# Patient Record
(demographics unavailable — no encounter records)

---

## 2017-01-17 NOTE — EDDOCDS
Physician Documentation                                                                           

[facility]                                                                         

Name: Jud Ren                                                                                

Age: 34 yrs                                                                                       

Sex: Female                                                                                       

: 1982                                                                                   

MRN: [id number]                                                                                     

Arrival Date: 2017                                                                          

Time: 02:35                                                                                       

Account#: [id number]                                                                              

Bed 9                                                                                             

Private MD:                                                                                       

Disposition:                                                                                      

17 04:15 Discharged to Home/Self Care. Impression: Acute serous otitis media, right ear.    

- Condition is Stable.                                                                            

- Discharge Instructions: Otitis Media, Adult, Otitis Media, Adult, Easy-to-Read.                 

- Prescriptions for Amoxicillin 500 mg Oral Capsule - take 1 capsule by ORAL route                

every 8 hours for 10 days; 30 tablet.                                                           

- Medication Reconciliation, Local Pharmacy Hours form.                                           

- [facility]; When: Call to arrange an appointment;            

Reason: To establish care.                                                                      

- Problem is an ongoing problem.                                                                  

- Symptoms have improved.                                                                         

                                                                                                  

                                                                                                  

                                                                                                  

Historical:                                                                                       

- Allergies: No known drug Allergies;                                                             

- Home Meds:                                                                                      

1. birth control                                                                                

- PMHx: none;                                                                                     

- PSHx: none;                                                                                     

- Social history: Smoking status: Patient states was never smoker of tobacco. No                  

barriers to communication noted, The patient speaks fluent English, Speaks                      

appropriately for age.                                                                          

- Family history: Not pertinent.                                                                  

- : The pt / caregiver states he / she is not on anticoagulants. Home medication list             

is obtained from the patient.                                                                   

- Exposure Risk Screening:: None identified.                                                      

                                                                                                  

                                                                                                  

OB/GYN:                                                                                           

                                                                                             

02:49 LMP 12/15/2016                                                                          cz  

                                                                                                  

Vital Signs:                                                                                      

02:49  / 83; Pulse 69; Resp 16; Temp 98.5(O); Pulse Ox 98% on R/A; Weight 69.4 kg / 153 cz  

      lbs; Height 5 ft. 9 in. (175.26 cm);                                                        

04:30  / 85; Pulse 68; Resp 18; Temp 99.5(TE); Pulse Ox 99% on R/A; Pain 8/10;          kenneth 

02:49 Body Mass Index 22.59 (69.40 kg, 175.26 cm)                                             cz  

                                                                                                  

MDM:                                                                                              

04:14 Amoxicillin 500 mg PO once ordered.                                                     mm11

                                                                                                  

Administered Medications:                                                                         

04:31 Drug: Amoxicillin 500 mg [amoxicillin 500 mg capsule (1 caps)] Route: PO;               ko2 

                                                                                                  

                                                                                                  

Signatures:                                                                                       

Richard Cline RN RN cz Maynard, Matthew, DO                    DO   mm11                                                 

Dedra Segundo RN RN   ko2                                                  

                                                                                                  

**************************************************************************************************

MTDD

## 2017-01-17 NOTE — EDDOCDS
Nurse's Notes                                                                                     

[facility]                                                                         

Name: Jud Ren                                                                                

Age: 34 yrs                                                                                       

Sex: Female                                                                                       

: 1982                                                                                   

MRN: [id number]                                                                                     

Arrival Date: 2017                                                                          

Time: 02:35                                                                                       

Account#: [id number]                                                                              

Bed 9                                                                                             

Private MD:                                                                                       

Diagnosis: Acute serous otitis media, right ear                                                   

                                                                                                  

Presentation:                                                                                     

                                                                                             

02:47 Presenting complaint: Patient states: URI symptoms since Friday developed right ear     cz  

      pain earlier tonight. Adult Sepsis Screening: The patient does not have new or              

      worsening altered mentation. Patient's respiratory rate is less than 22. Systolic blood     

      pressure is greater than 100. Patient has a qSOFA score of 0- Negative Sepsis Screen.       

      Suicide/Homicide risk assessment- the patient denies having any suicidal and/or             

      homicidal ideations and does not present with any other emotional, behavioral or mental     

      health complaints.  Status: The patient is a  dependent. Transition of      

      care: patient was not received from another setting of care.                                

02:47 Acuity: MAXIMO Level 5                                                                     cz  

02:47 Method Of Arrival: Walkin/Carried/Asstd                                                 cz  

                                                                                                  

Triage Assessment:                                                                                

02:49 General: Appears in no apparent distress. Pain: Location: right ear Pain currently is 8 cz  

      out of 10 on a pain scale. Pt Declines HIV testing.                                         

                                                                                                  

OB/GYN:                                                                                           

02:49 LMP 12/15/2016                                                                          cz  

                                                                                                  

Historical:                                                                                       

- Allergies: No known drug Allergies;                                                             

- Home Meds:                                                                                      

1. birth control                                                                                

- PMHx: none;                                                                                     

- PSHx: none;                                                                                     

- Social history: Smoking status: Patient states was never smoker of tobacco. No                  

barriers to communication noted, The patient speaks fluent English, Speaks                      

appropriately for age.                                                                          

- Family history: Not pertinent.                                                                  

- : The pt / caregiver states he / she is not on anticoagulants. Home medication list             

is obtained from the patient.                                                                   

- Exposure Risk Screening:: None identified.                                                      

                                                                                                  

                                                                                                  

Screenin:35 Screening information is obtained from the patient. Fall risk: No risks identified.     ko2 

      Assistance ADL's: requires no assistance with activities of daily living. Abuse/DV          

      Screen: The patient / caregiver reports he/she is: not in a situation that causes fear,     

      pain or injury. Nutritional screening: No deficits noted. Advance Directives:               

      Currently, there is no health care proxy. There is no active DNR order. home support is     

      adequate.                                                                                   

                                                                                                  

Assessment:                                                                                       

03:10 General: Appears uncomfortable, Behavior is appropriate for age, cooperative. Pain:     ko2 

      Location: right ear. Neurological: Level of Consciousness is awake, alert. EENT:            

      Reports pain in right ear. Respiratory: Airway is patent Respiratory effort is even,        

      unlabored. Derm: Skin is normal.                                                            

04:36 General: Appears uncomfortable, Behavior is appropriate for age, cooperative. Pain:     ko2 

      Location: right ear Pain currently is 7 out of 10 on a pain scale. Neurological: No         

      deficits noted. Respiratory: Airway is patent Respiratory effort is even, unlabored,        

      Reports cough that is. Derm: Skin is normal.                                                

                                                                                                  

Vital Signs:                                                                                      

02:49  / 83; Pulse 69; Resp 16; Temp 98.5(O); Pulse Ox 98% on R/A; Weight 69.4 kg;      cz  

      Height 5 ft. 9 in. (175.26 cm);                                                             

04:30  / 85; Pulse 68; Resp 18; Temp 99.5(TE); Pulse Ox 99% on R/A; Pain 8/10;          kenneth 

02:49 Body Mass Index 22.59 (69.40 kg, 175.26 cm)                                               

                                                                                                  

Vitals:                                                                                           

02:49 Log In Time: 2017 at 02:35.                                                 cz  

                                                                                                  

ED Course:                                                                                        

02:36 Patient visited by David Lang Reg.                                                 pm4 

02:36 Patient moved to Waiting                                                                pm4 

02:47 Patient moved to Triage 1                                                               cz  

02:48 Triage Initiated                                                                        cz  

02:52 Patient moved to Pre RCE                                                                cz  

03:06 Dedra Segundo,RN is Primary Nurse.                                                         cz  

03:06 Patient moved to 9                                                                      cz  

03:22 Patient visited by Bancroft, Kristopher, PCA.                                           kb5 

04:02 Liban Madrigal DO is Attending Physician.                                            mm11

04:02 Patient visited by Liban Madrigal DO.                                                mm11

04:14 Patient visited by Liban Madrigal DO.                                                mm11

04:15 [facility] is Referral Physician.                               mm11

04:36 The patient / caregiver is instructed regarding the plan of care and ED course.         ko2 

04:36 No IV's were initiated during this patient's visit. No procedures done that require     ko2 

      assistance.                                                                                 

                                                                                                  

Administered Medications:                                                                         

04:31 Drug: Amoxicillin 500 mg [amoxicillin 500 mg capsule (1 caps)] Route: PO;               ko2 

                                                                                                  

                                                                                                  

Order Results:                                                                                    

There are currently no results for this order.                                                    

Outcome:                                                                                          

04:15 Discharge ordered by Provider.                                                          mm11

04:37 Discharge Assessment: Patient awake, alert and oriented x 3. No cognitive and/or        ko2 

      functional deficits noted. Patient verbalized understanding of disposition                  

      instructions. patient administered narcotics - no. The following High Risk Discharge        

      criteria are identified: None. Discharged to home ambulatory, with parent. Condition:       

      stable. Discharge instructions given to patient, Instructed on discharge instructions,      

      follow up and referral plans. medication usage, Demonstrated understanding of               

      instructions, medications, Pt was receptive of discharge instructions/ teaching.            

      Prescriptions given X 1. No special radiology studies were completed. Property sent         

      home with patient.                                                                          

04:37 Patient left the ED.                                                                    ko2 

                                                                                                  

Signatures:                                                                                       

Richard Cline, RN                      RN   cz                                                   

Bancroft, Kristopher, PCA               PCA  kb5                                                  

Liban Madrigal, DO MICHAUD   mm11                                                 

Nicolasa Wheeler, PCA                     PCA  kenneth                                                  

Dedra Segundo RN RN   ko2                                                  

David Lang, Reg                     Reg  pm4                                                  

                                                                                                  

**************************************************************************************************

MTDD

## 2017-01-19 NOTE — EDDOCDS
Physician Documentation                                                                           

[facility]                                                                         

Name: Jud Ren                                                                                

Age: 34 yrs                                                                                       

Sex: Female                                                                                       

: 1982                                                                                   

MRN: [id number]                                                                                     

Arrival Date: 2017                                                                          

Time: 02:35                                                                                       

Account#: [id number]                                                                              

Bed 9                                                                                             

Private MD:                                                                                       

Disposition:                                                                                      

17 04:15 Discharged to Home/Self Care. Impression: Acute serous otitis media, right ear.    

- Condition is Stable.                                                                            

- Discharge Instructions: Otitis Media, Adult, Otitis Media, Adult, Easy-to-Read.                 

- Prescriptions for Amoxicillin 500 mg Oral Capsule - take 1 capsule by ORAL route                

every 8 hours for 10 days; 30 tablet.                                                           

- Medication Reconciliation, Local Pharmacy Hours form.                                           

- [facility]; When: Call to arrange an appointment;            

Reason: To establish care.                                                                      

- Problem is an ongoing problem.                                                                  

- Symptoms have improved.                                                                         

                                                                                                  

                                                                                                  

                                                                                                  

Historical:                                                                                       

- Allergies: No known drug Allergies;                                                             

- Home Meds:                                                                                      

1. birth control                                                                                

- PMHx: none;                                                                                     

- PSHx: none;                                                                                     

- Social history: Smoking status: Patient states was never smoker of tobacco. No                  

barriers to communication noted, The patient speaks fluent English, Speaks                      

appropriately for age.                                                                          

- Family history: Not pertinent.                                                                  

- : The pt / caregiver states he / she is not on anticoagulants. Home medication list             

is obtained from the patient.                                                                   

- Exposure Risk Screening:: None identified.                                                      

                                                                                                  

                                                                                                  

OB/GYN:                                                                                           

                                                                                             

02:49 LMP 12/15/2016                                                                          cz  

                                                                                                  

Vital Signs:                                                                                      

02:49  / 83; Pulse 69; Resp 16; Temp 98.5(O); Pulse Ox 98% on R/A; Weight 69.4 kg / 153 cz  

      lbs; Height 5 ft. 9 in. (175.26 cm);                                                        

04:30  / 85; Pulse 68; Resp 18; Temp 99.5(TE); Pulse Ox 99% on R/A; Pain 8/10;          kenneth 

02:49 Body Mass Index 22.59 (69.40 kg, 175.26 cm)                                             cz  

                                                                                                  

MDM:                                                                                              

04:14 Amoxicillin 500 mg PO once ordered.                                                     mm11

04:44 [facility] Payment Agreement was scanned into [employer] and attached to record.               hs2 

04:50 Financial registration complete.                                                        hs2 

12:10 T-Sheet-- Draft Copy was scanned into [employer] and attached to record.                   gb  

                                                                                                  

Administered Medications:                                                                         

04:31 Drug: Amoxicillin 500 mg [amoxicillin 500 mg capsule (1 caps)] Route: PO;               ko2 

                                                                                                  

                                                                                                  

Signatures:                                                                                       

Richard Cline, DARLENE                      RN   cz                                                   

Darling Pritchard, Reg                  Reg  gb                                                   

Liban Madrigal DO DO   mm11                                                 

Dedra Segundo RN RN   ko2                                                  

Jailene Pearl, Reg                   Reg  hs2                                                  

                                                                                                  

The chart was reviewed and I authenticate all verbal orders and agree with the evaluation and 
treatment provided.Attachments:

04:44 [facility] Payment Agreement                                                                hs2 

12:10 T-Sheet-- Draft Copy                                                                    gb  

                                                                                                  

**************************************************************************************************



*** Chart Complete ***
MTDD

## 2017-01-19 NOTE — EDDOCDS
Physician Documentation                                                                           

[facility]                                                                         

Name: Jud Ren                                                                                

Age: 34 yrs                                                                                       

Sex: Female                                                                                       

: 1982                                                                                   

MRN: [id number]                                                                                     

Arrival Date: 2017                                                                          

Time: 02:35                                                                                       

Account#: [id number]                                                                              

Bed 9                                                                                             

Private MD:                                                                                       

Disposition:                                                                                      

17 04:15 Discharged to Home/Self Care. Impression: Acute serous otitis media, right ear.    

- Condition is Stable.                                                                            

- Discharge Instructions: Otitis Media, Adult, Otitis Media, Adult, Easy-to-Read.                 

- Prescriptions for Amoxicillin 500 mg Oral Capsule - take 1 capsule by ORAL route                

every 8 hours for 10 days; 30 tablet.                                                           

- Medication Reconciliation, Local Pharmacy Hours form.                                           

- [facility]; When: Call to arrange an appointment;            

Reason: To establish care.                                                                      

- Problem is an ongoing problem.                                                                  

- Symptoms have improved.                                                                         

                                                                                                  

                                                                                                  

                                                                                                  

Historical:                                                                                       

- Allergies: No known drug Allergies;                                                             

- Home Meds:                                                                                      

1. birth control                                                                                

- PMHx: none;                                                                                     

- PSHx: none;                                                                                     

- Social history: Smoking status: Patient states was never smoker of tobacco. No                  

barriers to communication noted, The patient speaks fluent English, Speaks                      

appropriately for age.                                                                          

- Family history: Not pertinent.                                                                  

- : The pt / caregiver states he / she is not on anticoagulants. Home medication list             

is obtained from the patient.                                                                   

- Exposure Risk Screening:: None identified.                                                      

                                                                                                  

                                                                                                  

OB/GYN:                                                                                           

                                                                                             

02:49 LMP 12/15/2016                                                                          cz  

                                                                                                  

Vital Signs:                                                                                      

02:49  / 83; Pulse 69; Resp 16; Temp 98.5(O); Pulse Ox 98% on R/A; Weight 69.4 kg / 153 cz  

      lbs; Height 5 ft. 9 in. (175.26 cm);                                                        

04:30  / 85; Pulse 68; Resp 18; Temp 99.5(TE); Pulse Ox 99% on R/A; Pain 8/10;          kenneth 

02:49 Body Mass Index 22.59 (69.40 kg, 175.26 cm)                                             cz  

                                                                                                  

MDM:                                                                                              

04:14 Amoxicillin 500 mg PO once ordered.                                                     mm11

04:44 [facility] Payment Agreement was scanned into [employer] and attached to record.               hs2 

04:50 Financial registration complete.                                                        hs2 

12:10 T-Sheet-- Draft Copy was scanned into [employer] and attached to record.                   gb  

                                                                                                  

Administered Medications:                                                                         

04:31 Drug: Amoxicillin 500 mg [amoxicillin 500 mg capsule (1 caps)] Route: PO;               ko2 

                                                                                                  

                                                                                                  

Signatures:                                                                                       

Richard Cline, DARLENE                      RN   cz                                                   

Darling Pritchard, Reg                  Reg  gb                                                   

Liban Madrigal DO DO   mm11                                                 

Dedra Segundo RN RN   ko2                                                  

Jailene Pearl, Reg                   Reg  hs2                                                  

                                                                                                  

The chart was reviewed and I authenticate all verbal orders and agree with the evaluation and 
treatment provided.Attachments:

04:44 [facility] Payment Agreement                                                                hs2 

12:10 T-Sheet-- Draft Copy                                                                    gb  

                                                                                                  

**************************************************************************************************



*** Chart Complete ***
MTDD

## 2017-01-19 NOTE — EDDOCDS
Nurse's Notes                                                                                     

[facility]                                                                         

Name: Jud Ren                                                                                

Age: 34 yrs                                                                                       

Sex: Female                                                                                       

: 1982                                                                                   

MRN: [id number]                                                                                     

Arrival Date: 2017                                                                          

Time: 02:35                                                                                       

Account#: [id number]                                                                              

Bed 9                                                                                             

Private MD:                                                                                       

Diagnosis: Acute serous otitis media, right ear                                                   

                                                                                                  

Presentation:                                                                                     

                                                                                             

02:47 Presenting complaint: Patient states: URI symptoms since Friday developed right ear     cz  

      pain earlier tonight. Adult Sepsis Screening: The patient does not have new or              

      worsening altered mentation. Patient's respiratory rate is less than 22. Systolic blood     

      pressure is greater than 100. Patient has a qSOFA score of 0- Negative Sepsis Screen.       

      Suicide/Homicide risk assessment- the patient denies having any suicidal and/or             

      homicidal ideations and does not present with any other emotional, behavioral or mental     

      health complaints.  Status: The patient is a  dependent. Transition of      

      care: patient was not received from another setting of care.                                

02:47 Acuity: MAXIMO Level 5                                                                     cz  

02:47 Method Of Arrival: Walkin/Carried/Asstd                                                 cz  

                                                                                                  

Triage Assessment:                                                                                

02:49 General: Appears in no apparent distress. Pain: Location: right ear Pain currently is 8 cz  

      out of 10 on a pain scale. Pt Declines HIV testing.                                         

                                                                                                  

OB/GYN:                                                                                           

02:49 LMP 12/15/2016                                                                          cz  

                                                                                                  

Historical:                                                                                       

- Allergies: No known drug Allergies;                                                             

- Home Meds:                                                                                      

1. birth control                                                                                

- PMHx: none;                                                                                     

- PSHx: none;                                                                                     

- Social history: Smoking status: Patient states was never smoker of tobacco. No                  

barriers to communication noted, The patient speaks fluent English, Speaks                      

appropriately for age.                                                                          

- Family history: Not pertinent.                                                                  

- : The pt / caregiver states he / she is not on anticoagulants. Home medication list             

is obtained from the patient.                                                                   

- Exposure Risk Screening:: None identified.                                                      

                                                                                                  

                                                                                                  

Screenin:35 Screening information is obtained from the patient. Fall risk: No risks identified.     ko2 

      Assistance ADL's: requires no assistance with activities of daily living. Abuse/DV          

      Screen: The patient / caregiver reports he/she is: not in a situation that causes fear,     

      pain or injury. Nutritional screening: No deficits noted. Advance Directives:               

      Currently, there is no health care proxy. There is no active DNR order. home support is     

      adequate.                                                                                   

                                                                                                  

Assessment:                                                                                       

03:10 General: Appears uncomfortable, Behavior is appropriate for age, cooperative. Pain:     ko2 

      Location: right ear. Neurological: Level of Consciousness is awake, alert. EENT:            

      Reports pain in right ear. Respiratory: Airway is patent Respiratory effort is even,        

      unlabored. Derm: Skin is normal.                                                            

04:36 General: Appears uncomfortable, Behavior is appropriate for age, cooperative. Pain:     ko2 

      Location: right ear Pain currently is 7 out of 10 on a pain scale. Neurological: No         

      deficits noted. Respiratory: Airway is patent Respiratory effort is even, unlabored,        

      Reports cough that is. Derm: Skin is normal.                                                

                                                                                                  

Vital Signs:                                                                                      

02:49  / 83; Pulse 69; Resp 16; Temp 98.5(O); Pulse Ox 98% on R/A; Weight 69.4 kg;      cz  

      Height 5 ft. 9 in. (175.26 cm);                                                             

04:30  / 85; Pulse 68; Resp 18; Temp 99.5(TE); Pulse Ox 99% on R/A; Pain 8/10;          kenneth 

02:49 Body Mass Index 22.59 (69.40 kg, 175.26 cm)                                               

                                                                                                  

Vitals:                                                                                           

02:49 Log In Time: 2017 at 02:35.                                                 cz  

                                                                                                  

ED Course:                                                                                        

02:36 Patient visited by David Lang Reg.                                                 pm4 

02:36 Patient moved to Waiting                                                                pm4 

02:47 Patient moved to Triage 1                                                               cz  

02:48 Triage Initiated                                                                        cz  

02:52 Patient moved to Pre RCE                                                                cz  

03:06 Dedra Segundo,RN is Primary Nurse.                                                         cz  

03:06 Patient moved to 9                                                                      cz  

03:22 Patient visited by Bancroft, Kristopher, PCA.                                           kb5 

04:02 Liban Madrigal DO is Attending Physician.                                            mm11

04:02 Patient visited by Liban Madrigal DO.                                                mm11

04:14 Patient visited by Liban Madrigal DO.                                                mm11

04:15 [facility] is Referral Physician.                               mm11

04:36 The patient / caregiver is instructed regarding the plan of care and ED course.         ko2 

04:36 No IV's were initiated during this patient's visit. No procedures done that require     ko2 

      assistance.                                                                                 

04:44 [facility] Payment Agreement was scanned into [employer] and attached to record.               hs2 

04:46 Patient name changed from Jud\S\L\S\Mikal\S\ to Jud\S\Cindy\S\Mikal.                      
   EDMS

12:10 T-Sheet-- Draft Copy was scanned into [employer] and attached to record.                   gb  

                                                                                                  

Administered Medications:                                                                         

04:31 Drug: Amoxicillin 500 mg [amoxicillin 500 mg capsule (1 caps)] Route: PO;               ko2 

                                                                                                  

                                                                                                  

Order Results:                                                                                    

There are currently no results for this order.                                                    

Outcome:                                                                                          

04:15 Discharge ordered by Provider.                                                          mm11

04:37 Discharge Assessment: Patient awake, alert and oriented x 3. No cognitive and/or        ko2 

      functional deficits noted. Patient verbalized understanding of disposition                  

      instructions. patient administered narcotics - no. The following High Risk Discharge        

      criteria are identified: None. Discharged to home ambulatory, with parent. Condition:       

      stable. Discharge instructions given to patient, Instructed on discharge instructions,      

      follow up and referral plans. medication usage, Demonstrated understanding of               

      instructions, medications, Pt was receptive of discharge instructions/ teaching.            

      Prescriptions given X 1. No special radiology studies were completed. Property sent         

      home with patient.                                                                          

04:37 Patient left the ED.                                                                    ko2 

                                                                                                  

Signatures:                                                                                       

Dispatcher [facility]                           EDMS                                                 

Richard Cline, RN                      RN   cz                                                   

Darling Pritchard, Reg                  Reg  gb                                                   

Bancroft, Kristopher, PCA               PCA  kb5                                                  

Liban Madrigal,                     DO   mm11                                                 

Nicolasa Wheeler, PCA                     PCA  Dedra Fair RN RN   ko2                                                  

Jailene Pearl, Reg                   Reg  hs2                                                  

David Lang, Reg                     Reg  pm4                                                  

                                                                                                  

**************************************************************************************************



*** Chart Complete ***
MTDD